# Patient Record
Sex: FEMALE | Race: WHITE | NOT HISPANIC OR LATINO | Employment: FULL TIME | URBAN - METROPOLITAN AREA
[De-identification: names, ages, dates, MRNs, and addresses within clinical notes are randomized per-mention and may not be internally consistent; named-entity substitution may affect disease eponyms.]

---

## 2023-11-18 ASSESSMENT — ENCOUNTER SYMPTOMS
CONSTIPATION: 0
FREQUENCY: 1
ANOREXIA: 0
NAUSEA: 0
HEMATURIA: 0
DYSURIA: 0
FLANK PAIN: 0
CHILLS: 0
SORE THROAT: 0
ABDOMINAL PAIN: 0
BACK PAIN: 0
HEADACHES: 0
FEVER: 0
VOMITING: 0
DIARRHEA: 0

## 2023-11-21 ENCOUNTER — OFFICE VISIT (OUTPATIENT)
Dept: OBSTETRICS AND GYNECOLOGY | Facility: CLINIC | Age: 23
End: 2023-11-21
Payer: COMMERCIAL

## 2023-11-21 VITALS
SYSTOLIC BLOOD PRESSURE: 94 MMHG | WEIGHT: 144.6 LBS | BODY MASS INDEX: 21.92 KG/M2 | HEIGHT: 68 IN | DIASTOLIC BLOOD PRESSURE: 62 MMHG

## 2023-11-21 DIAGNOSIS — Z11.3 SCREEN FOR STD (SEXUALLY TRANSMITTED DISEASE): ICD-10-CM

## 2023-11-21 DIAGNOSIS — Z30.431 INTRAUTERINE DEVICE SURVEILLANCE: ICD-10-CM

## 2023-11-21 DIAGNOSIS — Z01.419 ENCOUNTER FOR ANNUAL ROUTINE GYNECOLOGICAL EXAMINATION: Primary | ICD-10-CM

## 2023-11-21 DIAGNOSIS — N89.8 VAGINAL IRRITATION: ICD-10-CM

## 2023-11-21 PROCEDURE — 99385 PREV VISIT NEW AGE 18-39: CPT

## 2023-11-21 PROCEDURE — 1036F TOBACCO NON-USER: CPT

## 2023-11-21 PROCEDURE — 88175 CYTOPATH C/V AUTO FLUID REDO: CPT | Mod: TC,GCY

## 2023-11-21 PROCEDURE — 87205 SMEAR GRAM STAIN: CPT

## 2023-11-21 PROCEDURE — 87800 DETECT AGNT MULT DNA DIREC: CPT

## 2023-11-21 RX ORDER — ESCITALOPRAM OXALATE 20 MG/1
20 TABLET ORAL DAILY
COMMUNITY
Start: 2018-02-18

## 2023-11-21 RX ORDER — LEVONORGESTREL 19.5 MG/1
INTRAUTERINE DEVICE INTRAUTERINE ONCE
COMMUNITY

## 2023-11-21 ASSESSMENT — ENCOUNTER SYMPTOMS
VOMITING: 0
UNEXPECTED WEIGHT CHANGE: 0
NEUROLOGICAL NEGATIVE: 0
CHILLS: 0
MUSCULOSKELETAL NEGATIVE: 0
EYES NEGATIVE: 0
COUGH: 0
COLOR CHANGE: 0
CARDIOVASCULAR NEGATIVE: 0
PSYCHIATRIC NEGATIVE: 0
FEVER: 0
HEMATOLOGIC/LYMPHATIC NEGATIVE: 0
GASTROINTESTINAL NEGATIVE: 0
SHORTNESS OF BREATH: 0
NAUSEA: 0
RESPIRATORY NEGATIVE: 0
DYSURIA: 0
ENDOCRINE NEGATIVE: 0
CONSTITUTIONAL NEGATIVE: 0
ALLERGIC/IMMUNOLOGIC NEGATIVE: 0
ABDOMINAL PAIN: 0
HEADACHES: 0
FATIGUE: 0
DIZZINESS: 0

## 2023-11-21 ASSESSMENT — LIFESTYLE VARIABLES
HOW OFTEN DO YOU HAVE A DRINK CONTAINING ALCOHOL: 2-4 TIMES A MONTH
AUDIT-C TOTAL SCORE: 2
HOW OFTEN DO YOU HAVE SIX OR MORE DRINKS ON ONE OCCASION: NEVER
SKIP TO QUESTIONS 9-10: 1
HOW MANY STANDARD DRINKS CONTAINING ALCOHOL DO YOU HAVE ON A TYPICAL DAY: 1 OR 2

## 2023-11-21 ASSESSMENT — PATIENT HEALTH QUESTIONNAIRE - PHQ9
2. FEELING DOWN, DEPRESSED OR HOPELESS: NOT AT ALL
1. LITTLE INTEREST OR PLEASURE IN DOING THINGS: NOT AT ALL
SUM OF ALL RESPONSES TO PHQ9 QUESTIONS 1 & 2: 0

## 2023-11-21 NOTE — PROGRESS NOTES
"Subjective   Janene Buckner is a 23 y.o. female who is here for a routine GYN exam as a new patient to the practice. Menses are regular once monthly with Kyleena IUD (inserted 2023). Denies breast changes or concerns. Does note first yeast infection earlier this Spring, tried an OTC treatment at the time with some relief, symptoms occurred again a few times during the summer but never fully resolved with OTC treatments, patient was unable to get in with any provides for PO yeast tx. Currently with some white thicker discharge and vaginal itching today; denies current burning or odor, pelvic pain, f/c/n/v. States she had STI testing done 2023 that was negative, 1 new partner (she) since then.     Complaints: vaginal itching  Periods: regular  Dysmenorrhea: none    Current contraception: Kyleena IUD  History of abnormal Pap smear: no  History of abnormal mammogram: no      OB History          0    Para   0    Term   0       0    AB   0    Living   0         SAB   0    IAB   0    Ectopic   0    Multiple   0    Live Births   0                  Review of Systems   Constitutional:  Negative for chills, fatigue, fever and unexpected weight change.   Respiratory:  Negative for cough and shortness of breath.    Gastrointestinal:  Negative for abdominal pain, nausea and vomiting.   Genitourinary:  Positive for vaginal discharge (with itching). Negative for dyspareunia, dysuria, menstrual problem and pelvic pain.   Skin:  Negative for color change and rash.   Neurological:  Negative for dizziness and headaches.       Objective   BP 94/62   Ht 1.721 m (5' 7.75\")   Wt 65.6 kg (144 lb 9.6 oz)   LMP 2023   BMI 22.15 kg/m²        General:   Alert and oriented, in no acute distress   Neck: Supple. No visible thyromegaly.    Breast/Axilla: Normal to palpation bilaterally without masses, skin changes, or nipple discharge.    Abdomen: Soft, non-tender, without masses or organomegaly   Vulva: Normal " architecture without erythema, masses, or lesions.    Vagina: Normal mucosa without lesions, masses, or atrophy. Small amount white discharge, no obvious odor; vaginal swab obtained    Cervix: Normal without masses, lesions; some erythema surrounding os; pap smear performed with STI testing. Some discomfort with pap; IUD strings from os   Uterus: Normal, mobile, non-enlarged uterus   Adnexa: Normal without masses or lesions   Pelvic Floor Normal    Psych Normal affect. Normal mood.      Assessment/Plan   -Due for pap smear, obtained.  -STI screening obtained.  -Kyleena IUD inserted 9/2023, effective until 9/2028.  -Swab obtained to rule out infectious etiology of vaginal irritation symptoms. Pending results, will tx accordingly. Patient does admit symptoms slightly improving so if negative infection may be resolving. Recommended monistat 7 for future episodes of sx and can use desitin or a/d ointment externally for itch relief.     Hiral Zelaya PA-C

## 2023-11-22 LAB
CLUE CELLS VAG LPF-#/AREA: NORMAL /[LPF]
NUGENT SCORE: 0
YEAST VAG WET PREP-#/AREA: NORMAL

## 2023-11-25 LAB
C TRACH RRNA SPEC QL NAA+PROBE: NEGATIVE
N GONORRHOEA DNA SPEC QL PROBE+SIG AMP: NEGATIVE

## 2023-12-08 ENCOUNTER — APPOINTMENT (OUTPATIENT)
Dept: OBSTETRICS AND GYNECOLOGY | Facility: CLINIC | Age: 23
End: 2023-12-08
Payer: COMMERCIAL

## 2023-12-11 LAB
CYTOLOGY CMNT CVX/VAG CYTO-IMP: NORMAL
LAB AP CONTRACEPTIVE HISTORY: NORMAL
LAB AP HPV GENOTYPE QUESTION: YES
LAB AP HPV HR: NORMAL
LAB AP PAP ADDITIONAL TESTS: NORMAL
LABORATORY COMMENT REPORT: NORMAL
LMP START DATE: NORMAL
PATH REPORT.TOTAL CANCER: NORMAL